# Patient Record
Sex: MALE | Race: OTHER | HISPANIC OR LATINO | URBAN - METROPOLITAN AREA
[De-identification: names, ages, dates, MRNs, and addresses within clinical notes are randomized per-mention and may not be internally consistent; named-entity substitution may affect disease eponyms.]

---

## 2024-10-28 ENCOUNTER — EMERGENCY (EMERGENCY)
Facility: HOSPITAL | Age: 54
LOS: 1 days | Discharge: HOME | End: 2024-10-28
Attending: EMERGENCY MEDICINE
Payer: MEDICAID

## 2024-10-28 VITALS
DIASTOLIC BLOOD PRESSURE: 98 MMHG | OXYGEN SATURATION: 100 % | TEMPERATURE: 98 F | SYSTOLIC BLOOD PRESSURE: 148 MMHG | RESPIRATION RATE: 17 BRPM | HEART RATE: 88 BPM

## 2024-10-28 VITALS
DIASTOLIC BLOOD PRESSURE: 99 MMHG | WEIGHT: 160.06 LBS | HEIGHT: 68 IN | SYSTOLIC BLOOD PRESSURE: 132 MMHG | HEART RATE: 100 BPM

## 2024-10-28 LAB
A1C WITH ESTIMATED AVERAGE GLUCOSE RESULT: 8.1 % — HIGH (ref 4–5.6)
ALBUMIN SERPL ELPH-MCNC: 4.1 G/DL — SIGNIFICANT CHANGE UP (ref 3.3–5)
ALP SERPL-CCNC: 77 U/L — SIGNIFICANT CHANGE UP (ref 40–120)
ALT FLD-CCNC: 15 U/L — SIGNIFICANT CHANGE UP (ref 10–45)
ANION GAP SERPL CALC-SCNC: 10 MMOL/L — SIGNIFICANT CHANGE UP (ref 5–17)
APTT BLD: 35.8 SEC — HIGH (ref 24.5–35.6)
AST SERPL-CCNC: 16 U/L — SIGNIFICANT CHANGE UP (ref 10–40)
BASOPHILS # BLD AUTO: 0.06 K/UL — SIGNIFICANT CHANGE UP (ref 0–0.2)
BASOPHILS NFR BLD AUTO: 1.2 % — SIGNIFICANT CHANGE UP (ref 0–2)
BILIRUB SERPL-MCNC: 0.4 MG/DL — SIGNIFICANT CHANGE UP (ref 0.2–1.2)
BUN SERPL-MCNC: 20 MG/DL — SIGNIFICANT CHANGE UP (ref 7–23)
CALCIUM SERPL-MCNC: 9.5 MG/DL — SIGNIFICANT CHANGE UP (ref 8.4–10.5)
CHLORIDE SERPL-SCNC: 99 MMOL/L — SIGNIFICANT CHANGE UP (ref 96–108)
CHOLEST SERPL-MCNC: 183 MG/DL — SIGNIFICANT CHANGE UP
CO2 SERPL-SCNC: 24 MMOL/L — SIGNIFICANT CHANGE UP (ref 22–31)
CREAT SERPL-MCNC: 1.16 MG/DL — SIGNIFICANT CHANGE UP (ref 0.5–1.3)
EGFR: 74 ML/MIN/1.73M2 — SIGNIFICANT CHANGE UP
EOSINOPHIL # BLD AUTO: 0.09 K/UL — SIGNIFICANT CHANGE UP (ref 0–0.5)
EOSINOPHIL NFR BLD AUTO: 1.7 % — SIGNIFICANT CHANGE UP (ref 0–6)
ESTIMATED AVERAGE GLUCOSE: 186 MG/DL — HIGH (ref 68–114)
GLUCOSE SERPL-MCNC: 272 MG/DL — HIGH (ref 70–99)
HCT VFR BLD CALC: 35 % — LOW (ref 39–50)
HDLC SERPL-MCNC: 54 MG/DL — SIGNIFICANT CHANGE UP
HGB BLD-MCNC: 12.3 G/DL — LOW (ref 13–17)
IMM GRANULOCYTES NFR BLD AUTO: 0.2 % — SIGNIFICANT CHANGE UP (ref 0–0.9)
INR BLD: 0.98 RATIO — SIGNIFICANT CHANGE UP (ref 0.85–1.16)
LIPID PNL WITH DIRECT LDL SERPL: 101 MG/DL — HIGH
LYMPHOCYTES # BLD AUTO: 1.7 K/UL — SIGNIFICANT CHANGE UP (ref 1–3.3)
LYMPHOCYTES # BLD AUTO: 32.7 % — SIGNIFICANT CHANGE UP (ref 13–44)
MAGNESIUM SERPL-MCNC: 1.9 MG/DL — SIGNIFICANT CHANGE UP (ref 1.6–2.6)
MCHC RBC-ENTMCNC: 28.5 PG — SIGNIFICANT CHANGE UP (ref 27–34)
MCHC RBC-ENTMCNC: 35.1 GM/DL — SIGNIFICANT CHANGE UP (ref 32–36)
MCV RBC AUTO: 81 FL — SIGNIFICANT CHANGE UP (ref 80–100)
MONOCYTES # BLD AUTO: 0.21 K/UL — SIGNIFICANT CHANGE UP (ref 0–0.9)
MONOCYTES NFR BLD AUTO: 4 % — SIGNIFICANT CHANGE UP (ref 2–14)
NEUTROPHILS # BLD AUTO: 3.13 K/UL — SIGNIFICANT CHANGE UP (ref 1.8–7.4)
NEUTROPHILS NFR BLD AUTO: 60.2 % — SIGNIFICANT CHANGE UP (ref 43–77)
NON HDL CHOLESTEROL: 130 MG/DL — HIGH
NRBC # BLD: 0 /100 WBCS — SIGNIFICANT CHANGE UP (ref 0–0)
PHOSPHATE SERPL-MCNC: 3.5 MG/DL — SIGNIFICANT CHANGE UP (ref 2.5–4.5)
PLATELET # BLD AUTO: 159 K/UL — SIGNIFICANT CHANGE UP (ref 150–400)
POTASSIUM SERPL-MCNC: 4.8 MMOL/L — SIGNIFICANT CHANGE UP (ref 3.5–5.3)
POTASSIUM SERPL-SCNC: 4.8 MMOL/L — SIGNIFICANT CHANGE UP (ref 3.5–5.3)
PROT SERPL-MCNC: 7.1 G/DL — SIGNIFICANT CHANGE UP (ref 6–8.3)
PROTHROM AB SERPL-ACNC: 11.2 SEC — SIGNIFICANT CHANGE UP (ref 9.9–13.4)
RBC # BLD: 4.32 M/UL — SIGNIFICANT CHANGE UP (ref 4.2–5.8)
RBC # FLD: 13.1 % — SIGNIFICANT CHANGE UP (ref 10.3–14.5)
SODIUM SERPL-SCNC: 133 MMOL/L — LOW (ref 135–145)
TRIGL SERPL-MCNC: 164 MG/DL — HIGH
TSH SERPL-MCNC: 2.06 UIU/ML — SIGNIFICANT CHANGE UP (ref 0.27–4.2)
WBC # BLD: 5.2 K/UL — SIGNIFICANT CHANGE UP (ref 3.8–10.5)
WBC # FLD AUTO: 5.2 K/UL — SIGNIFICANT CHANGE UP (ref 3.8–10.5)

## 2024-10-28 PROCEDURE — 84443 ASSAY THYROID STIM HORMONE: CPT

## 2024-10-28 PROCEDURE — 85025 COMPLETE CBC W/AUTO DIFF WBC: CPT

## 2024-10-28 PROCEDURE — 83735 ASSAY OF MAGNESIUM: CPT

## 2024-10-28 PROCEDURE — 99285 EMERGENCY DEPT VISIT HI MDM: CPT | Mod: 25

## 2024-10-28 PROCEDURE — 36415 COLL VENOUS BLD VENIPUNCTURE: CPT

## 2024-10-28 PROCEDURE — T1013: CPT

## 2024-10-28 PROCEDURE — 70450 CT HEAD/BRAIN W/O DYE: CPT | Mod: 26,MC,59

## 2024-10-28 PROCEDURE — 83036 HEMOGLOBIN GLYCOSYLATED A1C: CPT

## 2024-10-28 PROCEDURE — 85730 THROMBOPLASTIN TIME PARTIAL: CPT

## 2024-10-28 PROCEDURE — 93005 ELECTROCARDIOGRAM TRACING: CPT

## 2024-10-28 PROCEDURE — 80053 COMPREHEN METABOLIC PANEL: CPT

## 2024-10-28 PROCEDURE — 70450 CT HEAD/BRAIN W/O DYE: CPT | Mod: MC

## 2024-10-28 PROCEDURE — 70498 CT ANGIOGRAPHY NECK: CPT | Mod: 26,MC

## 2024-10-28 PROCEDURE — 84100 ASSAY OF PHOSPHORUS: CPT

## 2024-10-28 PROCEDURE — 70498 CT ANGIOGRAPHY NECK: CPT | Mod: MC

## 2024-10-28 PROCEDURE — 99285 EMERGENCY DEPT VISIT HI MDM: CPT

## 2024-10-28 PROCEDURE — 80061 LIPID PANEL: CPT

## 2024-10-28 PROCEDURE — 70496 CT ANGIOGRAPHY HEAD: CPT | Mod: MC

## 2024-10-28 PROCEDURE — 85610 PROTHROMBIN TIME: CPT

## 2024-10-28 PROCEDURE — 70496 CT ANGIOGRAPHY HEAD: CPT | Mod: 26,MC

## 2024-10-28 NOTE — ED PROVIDER NOTE - ATTENDING CONTRIBUTION TO CARE
------------ATTENDING NOTE------------  pt w/ friend c/o sudden loss of vison in L eye, painless, gradually worsening loss of vision in R eye, over past 2 ks, complicated by DM, no additional numbness/weakness or loss of function, sent to ED by Ophthalmology, awaiting Ophtho and Neuro consults and full labs/imaging -->  - Andrei Saunders MD   --------------------------------------------------------- ------------ATTENDING NOTE------------  pt w/ friend c/o sudden loss of vison in L eye, painless, gradually worsening loss of vision in R eye, over past 2 ks, complicated by DM, no additional numbness/weakness or loss of function, sent to ED by Ophthalmology, awaiting Ophtho and Neuro consults and full labs/imaging --> ED Sign out 1600 pending final imaging, Neuro and Ophtho recs, prelim ophtho w/ likely vitreous hemorrhage, all results for tx / dispo decisions.  - Andrei Saunders MD   ---------------------------------------------------------

## 2024-10-28 NOTE — ED PROVIDER NOTE - NSFOLLOWUPINSTRUCTIONS_ED_ALL_ED_FT
You were seen in the emergency department for vitreous hemmoahge . We have evaluated you and determined that you do not require further hospital interventions.    Please follow up with your primary care provider as necessary to discuss the results of your stay in our department. Please follow up with outpatient opthalmology within one week. [facility] will call you to make an appointment.     If you start to experience worsening symptoms such as flashes, increased floaters or curtain/veil , please return to the emergency department for further evaluation.    ¿Qué es la hemorragia vítrea?  La hemorragia vítrea se produce cuando la timbo se filtra en el humor vítreo dentro del amanda. La timbo filtrada proviene más comúnmente de los vasos sanguíneos en la parte posterior del amanda (vasos sanguíneos de la retina). [place] es más probable que suceda si los vasos sanguíneos wiley sido dañados (por ejemplo, por un traumatismo) o son particularmente frágiles (debido a shani enfermedad ocular relacionada con la diabetes).    Para que podamos yancy con claridad, el humor vítreo debe estar transparente. Si el humor vítreo está nublado o lleno de timbo, la visión se verá afectada. Esta alteración varía desde algunos "flotantes" y nubosidad de la visión hasta shani visión completamente oscura (a veces con un tinte rojizo).    Por lo tanto, la hemorragia vítrea puede causar desde flotadores, visión borrosa o apagada (agudeza visual reducida) hasta pérdida total de la visión.    ¿Qué es el humor vítreo?  El humor vítreo es shani sustancia gelatinosa transparente que constituye aproximadamente el 80% del volumen del globo ocular. Mantiene la forma del globo ocular y permite el paso jeanmarie.    Está compuesto principalmente de agua, luke también contiene algo de colágeno y ácido hialurónico. La parte exterior está formada por fibras finas que se adhieren a la retina por detrás y a la parte posterior del cristalino por kaykay.    [place] lateral del amanda  [place] lateral del amanda  ¿Cuáles son las causas de la hemorragia vítrea?  Las causas más comunes, que representan aproximadamente el 90% de todos los casos de hemorragia vítrea, son:    Sangrado por la formación anormal de nuevos vasos sanguíneos en la enfermedad ocular diabética avanzada.    Sangrado por desgarros en la retina causados ??por el desprendimiento del vítreo (yancy a continuación).    Traumatismo en el amanda (la causa más común en personas más jóvenes).    El sangrado dentro del amanda puede provenir de:    Vasos sanguíneos anormales que crecen porque la parte posterior del amanda tiene falta de oxígeno. Estos son frágiles y sangran fácilmente. Las condiciones en las que esto puede ocurrir incluyen:    Enfermedad ocular diabética (la causa más común).    Degeneración macular.    Oclusión de la vena retiniana.    Retinopatía con enfermedad de células falciformes.    Daño en la parte posterior del amanda en bebés muy prematuros que wiley estado recibiendo oxígeno en unidades de cuidados especiales para bebés.    Vasos sanguíneos normales que están dañados. Pueden dañarse por:    Desprendimiento del vítreo posterior, a menudo porque causa un desgarro de retina (yancy a continuación).    Macroaneurismas retinianos: vasos sanguíneos hinchados en la retina, generalmente relacionados con presión arterial fracisco, aterosclerosis y tabaquismo.    Traumatismo contundente: compresión repentina del amanda, por ejemplo, si se golpea con shani pelota de squash.    Traumatismo penetrante: esto causará sangrado en todo el amanda. El traumatismo penetrante puede ocurrir por lesiones a fracisco velocidad, linette rechinar y martillar. No siempre causan dolor ocular intenso.    Hemorragia subaracnoidea, que puede aumentar la presión en las venas de la retina y provocar sangrado.    Cirugía ocular, en particular si afecta el interior del amanda.    Timbo que se encuentra detrás de la retina y se dirige hacia el interior del amanda. Esta es la causa menos común de hemorragia vítrea. Puede ser consecuencia de:    Tumores en la parte posterior del amanda (los tumores oculares son poco frecuentes. El tipo más común es el melanoma ocular).    Vasos sanguíneos nuevos y frágiles detrás de la retina.    El desprendimiento del vítreo posterior suele producirse entre los 60 y los 80 años; nos sucede a la mayoría de nosotros entre esas edades. Se produce cuando el vítreo se separa de la retina en la parte posterior. [place] puede suceder de forma bastante repentina, ya que el vítreo tiende a encogerse con la edad.    Lo más común es que no haya síntomas. A veces, el desprendimiento del vítreo posterior provoca sangrado de la retina a medida que se separa. A veces, la retina se desgarra a medida que el vítreo se separa de melvin; en alexi tahira, es más probable que haya sangrado.    La mayoría de las personas sufren desprendimiento del vítreo posterior a los 60 años o más, y la mayoría no tiene shani hemorragia vítrea significativa cuando esto sucede. Sin embargo, los episodios de moscas volantes visibles son muy comunes y probablemente idalia causados ??por pequeños sangrados.    Síntomas de hemorragia vítrea  Los síntomas de hemorragias más pequeñas (la mayoría de los sangrados son sangrados más pequeños) incluyen:    Vasos volantes en la visión.    Farideh en forma de telaraña.    Farideh y sombras en el amanda.    Tinte brewster en la visión.    Los síntomas afectan más comúnmente a un solo amanda, aunque ambos ojos pueden verse afectados.    Algunas personas notan que mcqueen visión es peor por la mañana, porque la timbo se deposita en la parte posterior del amanda kedar la noche mientras están acostados.    Los sangrados más graves causan:    Farideh en la visión.    Puntos ciegos o jay oscuras.    Los sangrados más graves causan pérdida visual, que puede ser completa, dejando la visión borrosa de color brewster o erika. Para la mayoría de las personas, esto es extremadamente alarmante, en particular porque tiende a aparecer muy rápidamente sin shani explicación bahman.    ¿Qué rodriguez común es la hemorragia vítrea?  La hemorragia vítrea afecta a aproximadamente 7 de cada 100.000 personas cada año. [place] la convierte en shani de las causas más comunes de deterioro repentino de la visión. La mayoría de las veces afecta solo a un amanda.    ¿Quién es propenso a sufrir shani hemorragia vítrea?  La causa más común de hemorragia vítrea es la enfermedad ocular diabética grave, que se observa principalmente en adultos mayores. Las otras causas comunes de hemorragia vítrea son    ¿Cuál es el pronóstico de shani hemorragia vítrea?  El pronóstico de shani hemorragia vítrea depende tanto de la causa linette de la gravedad.    La hemorragia vítrea que se produce por un desprendimiento del vítreo posterior suele tener un buen pronóstico, con recuperación de la visión, especialmente si el amanda es normal.    Cuando shani enfermedad ocular diabética grave o shani degeneración macular wiley dado lugar a vasos sanguíneos anormales, el pronóstico de la visión es mucho menos bahena. El pronóstico de shani lesión ocular penetrante suele ser ree.    ¿Cómo se previene la hemorragia vítrea?  La prevención de la hemorragia vítrea implica prevenir las causas subyacentes. [place] incluye el tratamiento cuidadoso y regular de la enfermedad ocular diabética (que tiende a empeorar en la diabetes menos controlada) y la hipertensión arterial, y dejar de fumar.    El amanda siempre debe protegerse de los traumatismos kedar actividades de alto riesgo, linette limar, afilar y martillar, utilizar reagan de linda y practicar deportes con pelotas de fracisco velocidad, linette el squash. You were seen in the emergency department for vitreous hemorrhage . We have evaluated you and determined that you do not require further hospital interventions.    Please follow up with your primary care provider as necessary to discuss the results of your stay in our department. Please follow up with outpatient opthalmology within one week. [facility] will call you to make an appointment.     While here, your blood pressure was elevated.  Please follow up with your primary care doctor in  [place] regards to this matter.  As discussed, you had some abnormalities in your lab work including your cholesterol and your Hemoglobin A1C (a diabetes lab).  Please follow up with your PMD in [place] about these labs.    If you start to experience worsening symptoms such as flashes, increased floaters or curtain/veil , please return to the emergency department for further evaluation.    ¿Qué es la hemorragia vítrea?  La hemorragia vítrea se produce cuando la timbo se filtra en el humor vítreo dentro del amanda. La timbo filtrada proviene más comúnmente de los vasos sanguíneos en la parte posterior del amanda (vasos sanguíneos de la retina). [place] es más probable que suceda si los vasos sanguíneos wiley sido dañados (por ejemplo, por un traumatismo) o son particularmente frágiles (debido a shani enfermedad ocular relacionada con la diabetes).    Para que podamos yancy con claridad, el humor vítreo debe estar transparente. Si el humor vítreo está nublado o lleno de timbo, la visión se verá afectada. Esta alteración varía desde algunos "flotantes" y nubosidad de la visión hasta shani visión completamente oscura (a veces con un tinte rojizo).    Por lo tanto, la hemorragia vítrea puede causar desde flotadores, visión borrosa o apagada (agudeza visual reducida) hasta pérdida total de la visión.    ¿Qué es el humor vítreo?  El humor vítreo es shani sustancia gelatinosa transparente que constituye aproximadamente el 80% del volumen del globo ocular. Mantiene la forma del globo ocular y permite el paso jeanmarie.    Está compuesto principalmente de agua, luke también contiene algo de colágeno y ácido hialurónico. La parte exterior está formada por fibras finas que se adhieren a la retina por detrás y a la parte posterior del cristalino por kaykay.    [place] lateral del amanda  [place] lateral del amanda  ¿Cuáles son las causas de la hemorragia vítrea?  Las causas más comunes, que representan aproximadamente el 90% de todos los casos de hemorragia vítrea, son:    Sangrado por la formación anormal de nuevos vasos sanguíneos en la enfermedad ocular diabética avanzada.    Sangrado por desgarros en la retina causados ??por el desprendimiento del vítreo (yancy a continuación).    Traumatismo en el amanda (la causa más común en personas más jóvenes).    El sangrado dentro del amanda puede provenir de:    Vasos sanguíneos anormales que crecen porque la parte posterior del amanda tiene falta de oxígeno. Estos son frágiles y sangran fácilmente. Las condiciones en las que esto puede ocurrir incluyen:    Enfermedad ocular diabética (la causa más común).    Degeneración macular.    Oclusión de la vena retiniana.    Retinopatía con enfermedad de células falciformes.    Daño en la parte posterior del amanda en bebés muy prematuros que wiley estado recibiendo oxígeno en unidades de cuidados especiales para bebés.    Vasos sanguíneos normales que están dañados. Pueden dañarse por:    Desprendimiento del vítreo posterior, a menudo porque causa un desgarro de retina (yancy a continuación).    Macroaneurismas retinianos: vasos sanguíneos hinchados en la retina, generalmente relacionados con presión arterial fracisco, aterosclerosis y tabaquismo.    Traumatismo contundente: compresión repentina del amanda, por ejemplo, si se golpea con shani pelota de squash.    Traumatismo penetrante: esto causará sangrado en todo el amanda. El traumatismo penetrante puede ocurrir por lesiones a fracisco velocidad, linette rechinar y martillar. No siempre causan dolor ocular intenso.    Hemorragia subaracnoidea, que puede aumentar la presión en las venas de la retina y provocar sangrado.    Cirugía ocular, en particular si afecta el interior del amanda.    Timbo que se encuentra detrás de la retina y se dirige hacia el interior del amanda. Esta es la causa menos común de hemorragia vítrea. Puede ser consecuencia de:    Tumores en la parte posterior del amanda (los tumores oculares son poco frecuentes. El tipo más común es el melanoma ocular).    Vasos sanguíneos nuevos y frágiles detrás de la retina.    El desprendimiento del vítreo posterior suele producirse entre los 60 y los 80 años; nos sucede a la mayoría de nosotros entre esas edades. Se produce cuando el vítreo se separa de la retina en la parte posterior. [place] puede suceder de forma bastante repentina, ya que el vítreo tiende a encogerse con la edad.    Lo más común es que no haya síntomas. A veces, el desprendimiento del vítreo posterior provoca sangrado de la retina a medida que se separa. A veces, la retina se desgarra a medida que el vítreo se separa de melvin; en alexi [place]ahira, es más probable que haya sangrado.    La mayoría de las personas sufren desprendimiento del vítreo posterior a los 60 años o más, y la mayoría no tiene shani hemorragia vítrea significativa cuando esto sucede. Sin embargo, los episodios de moscas volantes visibles son muy comunes y probablemente idalia causados ??por pequeños sangrados.    Síntomas de hemorragia vítrea  Los síntomas de hemorragias más pequeñas (la mayoría de los sangrados son sangrados más pequeños) incluyen:    Vasos volantes en la visión.    Farideh en forma de telaraña.    Farideh y sombras en el amanda.    Tinte brewster en la visión.    Los síntomas afectan más comúnmente a un solo amanda, aunque ambos ojos pueden verse afectados.    Algunas personas notan que mcqueen visión es peor por la mañana, porque la timbo se deposita en la parte posterior del amanda kedar la noche mientras están acostados.    Los sangrados más graves causan:    Farideh en la visión.    Puntos ciegos o ajy oscuras.    Los sangrados más graves causan pérdida visual, que puede ser completa, dejando la visión borrosa de color brewster o erika. Para la mayoría de las personas, esto es extremadamente alarmante, en particular porque tiende a aparecer muy rápidamente sin shani explicación bahman.    ¿Qué rodriguez común es la hemorragia vítrea?  La hemorragia vítrea afecta a aproximadamente 7 de cada 100.000 personas cada año. [place] la convierte en shani de las causas más comunes de deterioro repentino de la visión. La mayoría de las veces afecta solo a un amanda.    ¿Quién es propenso a sufrir shani hemorragia vítrea?  La causa más común de hemorragia vítrea es la enfermedad ocular diabética grave, que se observa principalmente en adultos mayores. Las otras causas comunes de hemorragia vítrea son    ¿Cuál es el pronóstico de shani hemorragia vítrea?  El pronóstico de shani hemorragia vítrea depende tanto de la causa linette de la gravedad.    La hemorragia vítrea que se produce por un desprendimiento del vítreo posterior suele tener un buen pronóstico, con recuperación de la visión, especialmente si el amanda es normal.    Cuando sahni enfermedad ocular diabética grave o shani degeneración macular wiley dado lugar a vasos sanguíneos anormales, el pronóstico de la visión es mucho menos bahena. El pronóstico de shani lesión ocular penetrante suele ser ree.    ¿Cómo se previene la hemorragia vítrea?  La prevención de la hemorragia vítrea implica prevenir las causas subyacentes. [place] incluye el tratamiento cuidadoso y regular de la enfermedad ocular diabética (que tiende a empeorar en la diabetes menos controlada) y la hipertensión arterial, y dejar de fumar.    El amanda siempre debe protegerse de los traumatismos kedar actividades de alto riesgo, linette limar, afilar y martillar, utilizar reagan de linda y practicar deportes con pelotas de fracisco velocidad, linette el squash.

## 2024-10-28 NOTE — ED ADULT TRIAGE NOTE - ARRIVAL FROM
Post-Procedure Discharge Instructions    If you were instructed to stop/hold any medications before the procedure, you may restart them the day after the procedure.   Remove your dressing this evening or tomorrow.  A small bruise and tenderness at the site are normal for a few days.    You may use ice for the first 48 hours.  Apply for 20 minutes every 1-2 hours. Do not use ice on areas of numbness or decreased feeling. DO NOT USE HEAT for 48 hours.  This includes creams, prolonged hot baths, hot tubs, etc.  Do not take a tub bath or soak injection site for 48 hours. You may take a shower 4 hours after injection.   Do not increase your present activity.  Do what is comfortable for you.  If you get tired or have pain- REST.  A temporary increase in pain may occur that should settle down in 1-3 days.  You may experience some numbness in your neck, back, arms, or legs- this is temporary and feeling should return.   Post injection flares (joint swelling and pain) for several hours after the injection is normal.     You received a steroid medication:    You may notice discomfort after the local anesthetic wears off, before the steroid takes full effect.  The steroid may take 3-5 days to start to improve your symptoms.  Steroids may cause your blood sugar to increase temporarily.  If you are diabetic check your blood glucose levels more closely and report any major changes to your primary doctor.    SYMPTOMS TO REPORT- Please report any of these to your physician immediately. Kenji Elliott MD  [phone]  If you feel you need immediate attention, please go to the nearest emergency room or call 911.         Excessive bleeding or drainage, swelling at the injection site  Persistent chills or fever greater than 100 degrees  Major change in pain pattern or level  New onset of numbness or weakness  Severe nausea/vomiting  A severe persistent headache, inability to urinate for 24 hours, loss of bowel or bladder control.      If you received a thoracic injection (the chest area or mid-back) and experience shortness of breath, please go directly to your nearest emergency room or call 911.       FOLLOW-UP: If you were not given an appointment prior to leaving please call the office and follow-up as directed. Kenji Elliott MD  [phone]                       Home

## 2024-10-28 NOTE — ED ADULT NURSE NOTE - OBJECTIVE STATEMENT
55yo M aaox4 h/o DM on pills, presents ambulatory to Ed from home , pt is Citizen of the [place] speaking, RN offered translation services, pt refused, "OK" that primary RN translates, as per pt +2 weeks sago sudden onset decreased b/l eye loss vision , eye pain/ itching, on examination Patient is well appearing, skin warm and intact, PERRL, EOM intact, sensory intact, equal strength b/l in all upper and lower extremities. Pt denies CP, SOB, HA,, n/v/d, fevers chills, Safety and comfort measures initiated- bed placed in lowest position and side rails raised.

## 2024-10-28 NOTE — ED ADULT NURSE NOTE - CHIEF COMPLAINT QUOTE
Arnaldo #[id number]   Decreased vision x2 weeks ago refer Ophthalmologist   Denies trauma    "Blood in eye" Diabetic Type 2 diabetic   Does not wear eyeglasses

## 2024-10-28 NOTE — CONSULT NOTE ADULT - ASSESSMENT
ALL RECOMMENDATIONS PRELIMINARY PENDING ATTENDING ATTESTATION    54M w/ pmh T2DM, presents with several weeks of bilateral visual loss after being sent in by outpatient ophthalmology. Exam notable for fixed pupils severely impaired vision, but no other apparent neurologic deficits.    RECOMMENDATIONS: ALL RECOMMENDATIONS PRELIMINARY PENDING ATTENDING ATTESTATION    54M w/ pmh T2DM, presents with several weeks of bilateral visual loss after being sent in by outpatient ophthalmology. Exam notable for fixed pupils severely impaired vision, but no other apparent neurologic deficits.    RECOMMENDATIONS:  -Recommend urgent ophthalmology evaluation      -If concerned for non-ophthalmologic process or CRAO, please recontact neuro team  -Will f/u CT imaging; if c/f CRAO, would likely benefit from MRI/V of brain and orbits  -neurology will follow peripherally pending imaging and ophtho evaluation ALL RECOMMENDATIONS PRELIMINARY PENDING ATTENDING ATTESTATION    54M w/ pmh T2DM, presents with several weeks of bilateral visual loss after being sent in by outpatient ophthalmology. Exam notable for fixed pupils severely impaired vision, but no other apparent neurologic deficits.    RECOMMENDATIONS:  -Recommend urgent ophthalmology evaluation      -If concerned for non-ophthalmologic process or CRAO, please recontact neuro team  [x] f/u CT imaging; given findings followup with neuro-ophthalmology outpatient Dr. Akers

## 2024-10-28 NOTE — ED PROVIDER NOTE - SHIFT CHANGE DETAILS
Attending MD Rouse: 54M w change in vision OU  x 2 wks, pending neuro/ophtho final recs, pending CTH/CTAHN, ?CDU for endo if patient does not have good follow up

## 2024-10-28 NOTE — ED PROVIDER NOTE - PHYSICAL EXAMINATION
VITAL SIGNS: I have reviewed nursing notes and confirm.  CONSTITUTIONAL:  in no acute distress.  SKIN: Skin exam is warm and dry, no acute rash.  HEAD: Normocephalic; atraumatic.  EYES: PERRL, EOM intact; conjunctiva and sclera clear.  ENT: Airway clear, trachea midline   CARD: Regular rate and rhythm.  EXT: Normal ROM.

## 2024-10-28 NOTE — ED ADULT TRIAGE NOTE - CHIEF COMPLAINT QUOTE
Arnaldo #[id number]   Decreased vision x2 weeks ago refer Ophthalmologist   Denies trauma    "Blood in eye" Diabetic Type 2 diabetic   Does not wear eyeglasses

## 2024-10-28 NOTE — ED PROVIDER NOTE - CLINICAL SUMMARY MEDICAL DECISION MAKING FREE TEXT BOX
Patient is a 54y Male w/ PMHx of non insulin dependent T2DM presenting with complaints of decreased vision for 2 weeks. Patient stated two weeks ago he experienced sudden bilateral vision loss associated with a few episodes of pain in eyes & tearing. Stated he is able to see movement, colors, and contours but is unable to read text on his phone screen or newspaper. Stated was evaluated by Ophthalmologist Dr. Fransisco Shay ([facility]) today & advised to seek ED evaluation. Denies floaters, flashes of light, trauma, purulent drainage, foreign body sensation, use of corrective lenses.   PE remarkable for well appearing patient, no acute distress. Patient unable to read snellen chart.    Concerns for cataracts, glaucoma, acute intracranial pathology (ie Amaurosis fugax). Will order CTA Head/Neck, CT head, Hgb A1c, CBC, CMP, Lipid, Mg, Phos, PTT/PT/INR, TSH to evaluate.

## 2024-10-28 NOTE — CONSULT NOTE ADULT - SUBJECTIVE AND OBJECTIVE BOX
[facility] DEPARTMENT OF OPHTHALMOLOGY - INITIAL ADULT CONSULT  ----------------------------------------------------------------------------------------------------  Hima Soto MD PGY-3  Available on teams  ----------------------------------------------------------------------------------------------------    HPI:    Interval History: Pt w 2 weeks of blurry vision and black spots in the vision. No pain.    PAST MEDICAL & SURGICAL HISTORY:    Past Ocular History: denies  Ophthalmic Medications: none  FAMILY HISTORY: none    MEDICATIONS  (STANDING):    MEDICATIONS  (PRN):    Allergies & Intolerances:   No Known Allergies    Review of Systems:  Constitutional: No fever, chills  Eyes: blurry vision  Neuro: No tremors  Cardiovascular: No chest pain, palpitations  Respiratory: No SOB, no cough  GI: No nausea, vomiting, abdominal pain  : No dysuria  Skin: no rash  Psych: no depression  Endocrine: no polyuria, polydipsia  Heme/lymph: no swelling    VITALS: T(C): 36.4 (10-28-24 @ 15:11)  T(F): 97.5 (10-28-24 @ 15:11), Max: 97.5 (10-28-24 @ 15:11)  HR: 86 (10-28-24 @ 15:11) (86 - 100)  BP: 166/109 (10-28-24 @ 15:11) (132/99 - 166/109)  RR:  (16 - 18)  SpO2:  (99% - 100%)  Wt(kg): --  General: AAO x 3, appropriate mood and affect    Ophthalmology Exam:  Visual acuity (cc): 20/400 PHNI OD, 20/200 PHNI OS  Pupils: PERRL OU, no APD  Ttono: 16 OD, 16 OS  Extraocular movements (EOMs): Full OU, no pain, no diplopia  Confrontational Visual Field (CVF): Only able to see in inferotemporal quadrant OU, however poorly cooperative, difficult to assess    Pen Light Exam (PLE)  External: Flat OU  Lids/Lashes/Lacrimal Ducts: Flat OU    Sclera/Conjunctiva: W+Q OU  Cornea: Cl OU  Anterior Chamber: D+F OU    Iris: Flat OU  Lens: Cl OU    Fundus Exam: dilated with 1% tropicamide and 2.5% phenylephrine  Approval obtained from ED for dilation  Patient aware that pupils can remained dilated for at least 4-6 hours  Exam performed with 20D lens    Vitreous: vit heme OU  Disc, cup/disc: sharp and pink  Macula: flat OU  Vessels: flat OU  Periphery: possible area of traction/fibrosis OD, flat OS    B-scan OD: No RD seen, vit heme, no mass    Labs/Imaging:  ***  
CHIEF COMPLAINT: Bilateral vision loss    HPI:  54M w/ pmh T2DM, presents with several weeks of visual loss. Patient evaluated with  (ID [id number]). Patient states he initially lost vision in the R eye ~2mo ago. There was no pain associated with the vision loss. Subsequently, his vision began to improve somewhat up until 2mo ago when he had a drastic decrease in vision of both eyes. Patient says he was evaluated at [facility], where he was setup with an outpatient ophthalmology appointment. Patient went to that appointment today and was told to present to the ED. While patient does not have documentation from either encounter, he states the ophthalmologist said he might have a "blood clot" in his left eye and might need "a procedure" for his right eye. Patient otherwise without any numbness/weakness, no trouble speaking or slurring speech, no dizziness or unsteadiness.      PAST MEDICAL & SURGICAL HISTORY:  T2DM    Advance Directives:    Allergies  No Known Allergies    Intolerances        HOME MEDICATIONS:    REVIEW OF SYSTEMS:  [X] All other systems negative  [ ] Unable to assess ROS because ________    OBJECTIVE:  ICU Vital Signs Last 24 Hrs  T(C): --  T(F): --  HR: 96 (28 Oct 2024 11:44) (96 - 100)  BP: 151/112 (28 Oct 2024 11:44) (132/99 - 151/112)  BP(mean): --  ABP: --  ABP(mean): --  RR: 18 (28 Oct 2024 11:44) (18 - 18)  SpO2: 99% (28 Oct 2024 11:44) (99% - 99%)    O2 Parameters below as of 28 Oct 2024 11:44  Patient On (Oxygen Delivery Method): room air          PHYSICAL EXAM:  General: Well-groomed, NAD, sitting in bed, on RA  HEENT: pupils fixed, non-reactive to light; possibly cataracts EOMI, non-icteric  Neck:  symmetric,  JVD absent  Neurological: unable to fingers with eyes; can see changes in light and outlines of shapes; Sensation grossly intact; strength 5/5 in all extremities.  Psychiatry: AOx3, appropriate insight/judgement, appropriate affect, recent/remote memory intact    HOSPITAL MEDICATIONS:  MEDICATIONS  (STANDING):    MEDICATIONS  (PRN):      LABS:                        12.3   5.20  )-----------( 159      ( 28 Oct 2024 12:00 )             35.0     10-28    133[L]  |  99  |  20  ----------------------------<  272[H]  4.8   |  24  |  1.16    Ca    9.5      28 Oct 2024 12:00  Phos  3.5     10-28  Mg     1.9     10-28    TPro  7.1  /  Alb  4.1  /  TBili  0.4  /  DBili  x   /  AST  16  /  ALT  15  /  AlkPhos  77  10-28    PT/INR - ( 28 Oct 2024 12:00 )   PT: 11.2 sec;   INR: 0.98 ratio         PTT - ( 28 Oct 2024 12:00 )  PTT:35.8 sec  Urinalysis Basic - ( 28 Oct 2024 12:00 )    Color: x / Appearance: x / SG: x / pH: x  Gluc: 272 mg/dL / Ketone: x  / Bili: x / Urobili: x   Blood: x / Protein: x / Nitrite: x   Leuk Esterase: x / RBC: x / WBC x   Sq Epi: x / Non Sq Epi: x / Bacteria: x            MICROBIOLOGY:     RADIOLOGY:  [ ] Reviewed and interpreted by me    EKG:

## 2024-10-28 NOTE — ED PROVIDER NOTE - OBJECTIVE STATEMENT
Patient is a 54y Male w/ PMHx of non insulin dependent T2DM presenting with complaints of decreased vision for 2 weeks. Patient stated two weeks ago he experienced sudden bilateral vision loss associated with a few episodes of pain in eyes & tearing. Stated he is able to see movement, colors, and contours but is unable to read text on his phone screen or newspaper. Stated was evaluated by Ophthalmologist Dr. Fransisco Shay (OC) today & advised to seek ED evaluation. Denies floaters, flashes of light, trauma, purulent drainage, foreign body sensation, use of corrective lenses.

## 2024-10-28 NOTE — ED PROVIDER NOTE - PATIENT PORTAL LINK FT
You can access the FollowMyHealth Patient Portal offered by [facility] by registering at the following website: [email or web address]. By joining [employer]’s FollowMyHealth portal, you will also be able to view your health information using other applications (apps) compatible with our system.

## 2024-10-28 NOTE — ED ADULT NURSE NOTE - NSFALLUNIVINTERV_ED_ALL_ED
Bed/Stretcher in lowest position, wheels locked, appropriate side rails in place/Call bell, personal items and telephone in reach/Instruct patient to call for assistance before getting out of bed/chair/stretcher/Non-slip footwear applied when patient is off stretcher/[place] to call system/Physically safe environment - no spills, clutter or unnecessary equipment/Purposeful proactive rounding/Room/bathroom lighting operational, light cord in reach

## 2024-10-28 NOTE — CONSULT NOTE ADULT - ASSESSMENT
54y male with a past medical history/ocular history of diabetes and htn consulted for bilateral blurry vision.    #Vitreous hemorrhage, bilateral  -Pt presented with painless blurred vision and floaters of both eyes exam consistent with vitreous hemorrhage that explains vision and VF defect   -In the setting of hx of diabetes, etiology most likely diabetes. B-scan without clear retinal tear or detachment.  -No indication for ophthalmic intervention  -Pt should keep head elevated at 30-degrees, avoid head shaking, bending, straining, heavy lifting  -Avoid NSAIDs unless medically indicated. Continue AC/AP per neurology/cardiology  -Pt instructed to seek urgent care in case of flashes, increased floaters or curtain/veil  -Pt will follow up with [facility] this week for further testing    Seen and discussed with Dr. Henry.    Outpatient Follow-up: Patient should follow-up with his/her ophthalmologist or with [facility] Department of Ophthalmology within 1 week of after discharge at:    [facility]  [address] [place] [address]  [phone]     Hima Soto MD, PGY-3  Also available on [employer] Teams

## 2024-10-28 NOTE — ED PROVIDER NOTE - CARE PLAN
1 Principal Discharge DX:	Vision loss, left eye  Secondary Diagnosis:	Vision loss of right eye   Principal Discharge DX:	Vitreous hemorrhage  Secondary Diagnosis:	Vision loss of right eye  Secondary Diagnosis:	Diabetes mellitus with hyperglycemia

## 2024-10-28 NOTE — ED PROVIDER NOTE - NSFOLLOWUPCLINICS_GEN_ALL_ED_FT
[facility] - Ophthalmology  Ophthalmology  [address], [address]  [place] [address]  Phone: [phone]  Fax:     [facility] Ophthalmology  Ophthalmology  [address], [address]  [place] [address]  Phone: [phone]  Fax:

## 2024-11-11 PROBLEM — Z00.00 ENCOUNTER FOR PREVENTIVE HEALTH EXAMINATION: Status: ACTIVE | Noted: 2024-11-11

## 2024-12-03 ENCOUNTER — APPOINTMENT (OUTPATIENT)
Dept: INTERNAL MEDICINE | Facility: CLINIC | Age: 54
End: 2024-12-03